# Patient Record
Sex: MALE | Race: BLACK OR AFRICAN AMERICAN
[De-identification: names, ages, dates, MRNs, and addresses within clinical notes are randomized per-mention and may not be internally consistent; named-entity substitution may affect disease eponyms.]

---

## 2022-09-21 ENCOUNTER — APPOINTMENT (OUTPATIENT)
Dept: OPHTHALMOLOGY | Facility: CLINIC | Age: 62
End: 2022-09-21

## 2022-09-21 ENCOUNTER — NON-APPOINTMENT (OUTPATIENT)
Age: 62
End: 2022-09-21

## 2022-09-21 PROCEDURE — ZZZZZ: CPT

## 2022-09-21 PROCEDURE — 92015 DETERMINE REFRACTIVE STATE: CPT

## 2022-09-21 PROCEDURE — 92004 COMPRE OPH EXAM NEW PT 1/>: CPT

## 2023-09-27 ENCOUNTER — APPOINTMENT (OUTPATIENT)
Dept: OPHTHALMOLOGY | Facility: CLINIC | Age: 63
End: 2023-09-27

## 2024-03-07 PROBLEM — Z00.00 ENCOUNTER FOR PREVENTIVE HEALTH EXAMINATION: Status: ACTIVE | Noted: 2024-03-07

## 2024-03-11 ENCOUNTER — NON-APPOINTMENT (OUTPATIENT)
Age: 64
End: 2024-03-11

## 2024-03-11 VITALS — BODY MASS INDEX: 24.55 KG/M2 | WEIGHT: 162 LBS | HEIGHT: 68 IN

## 2024-03-11 DIAGNOSIS — Z87.891 PERSONAL HISTORY OF NICOTINE DEPENDENCE: ICD-10-CM

## 2024-03-11 NOTE — ASSESSMENT
[Discussed Risks and Advised to Quit Smoking] : Discussed risks and advised to quit smoking [Maintenance] : Maintenance: The patient has quit for more than 6 months

## 2024-03-11 NOTE — HISTORY OF PRESENT ILLNESS
[Former] : Former [>=20 Pack-Years] : Twenty pack years or greater smoking history: Yes [TextBox_13] :  Referred by Darcy Cosby  Mr. GAURANG LOPEZ  is a 63 year old man with a history of childhood asthma.   He  was seen in the office by Dr. Cosby for review of eligibility for, as well as, discussion of Low-Dose CT lung cancer screening program. Over the telephone today we reviewed and confirmed that the patient meets screening eligibility criteria: -Age: 63 year  Smoking status:  -Current smoker -Number of pack(s) per day: 1 -Number of years smoked: 47 -Number of pack years smokin -Number of years since quitting smokin -Quit year:   Mr. LOPEZ denies any signs or symptoms of lung cancer including new cough, change in cough, hemoptysis and unintentional weight loss.   Mr. LOPEZ denies any personal history of lung cancer. No lung cancer in a 1st degree relative. Denies any history of occupational exposures  [PacksperYear] : 47

## 2024-03-19 ENCOUNTER — APPOINTMENT (OUTPATIENT)
Dept: CT IMAGING | Facility: CLINIC | Age: 64
End: 2024-03-19
Payer: COMMERCIAL

## 2024-03-19 PROCEDURE — 71271 CT THORAX LUNG CANCER SCR C-: CPT
